# Patient Record
Sex: MALE | Race: WHITE | NOT HISPANIC OR LATINO | Employment: OTHER | URBAN - METROPOLITAN AREA
[De-identification: names, ages, dates, MRNs, and addresses within clinical notes are randomized per-mention and may not be internally consistent; named-entity substitution may affect disease eponyms.]

---

## 2017-04-26 PROBLEM — S89.91XA RIGHT KNEE INJURY: Status: ACTIVE | Noted: 2017-04-26

## 2017-05-10 PROBLEM — S82.141A CLOSED FRACTURE OF RIGHT TIBIAL PLATEAU: Status: ACTIVE | Noted: 2017-05-10

## 2018-10-05 PROBLEM — R07.9 CHEST PAIN: Status: ACTIVE | Noted: 2018-10-05

## 2018-10-05 PROBLEM — Z72.0 TOBACCO ABUSE: Status: ACTIVE | Noted: 2018-10-05

## 2018-10-05 PROBLEM — E10.9 TYPE 1 DIABETES: Status: ACTIVE | Noted: 2018-10-05

## 2018-10-05 PROBLEM — R56.9 SEIZURES: Status: ACTIVE | Noted: 2018-10-05

## 2018-10-06 PROBLEM — R07.89 CHEST PAIN, MUSCULOSKELETAL: Status: ACTIVE | Noted: 2018-10-05

## 2018-11-22 PROBLEM — S72.009A HIP FRACTURE: Status: ACTIVE | Noted: 2018-11-22

## 2018-11-22 PROBLEM — J18.9 PNEUMONIA: Status: ACTIVE | Noted: 2018-11-22

## 2018-12-06 DIAGNOSIS — M25.551 RIGHT HIP PAIN: Primary | ICD-10-CM

## 2018-12-07 ENCOUNTER — TELEPHONE (OUTPATIENT)
Dept: ORTHOPEDICS | Facility: CLINIC | Age: 43
End: 2018-12-07

## 2018-12-07 NOTE — TELEPHONE ENCOUNTER
Patient showed up for an appt with Dr. Ibarra today at 217pm. His appt was for 930am. Patient's wife visibly upset that Dr. Ibarra is not here this afternoon. Rescheduled patient to Monday at 2pm.

## 2018-12-10 ENCOUNTER — OFFICE VISIT (OUTPATIENT)
Dept: ORTHOPEDICS | Facility: CLINIC | Age: 43
End: 2018-12-10
Payer: MEDICARE

## 2018-12-10 ENCOUNTER — HOSPITAL ENCOUNTER (OUTPATIENT)
Dept: RADIOLOGY | Facility: HOSPITAL | Age: 43
Discharge: HOME | End: 2018-12-10
Attending: ORTHOPAEDIC SURGERY
Payer: MEDICARE

## 2018-12-10 VITALS
HEIGHT: 68 IN | HEART RATE: 99 BPM | SYSTOLIC BLOOD PRESSURE: 118 MMHG | DIASTOLIC BLOOD PRESSURE: 79 MMHG | BODY MASS INDEX: 19.14 KG/M2 | WEIGHT: 126.31 LBS

## 2018-12-10 DIAGNOSIS — S72.001A CLOSED FRACTURE OF RIGHT HIP, INITIAL ENCOUNTER: Primary | ICD-10-CM

## 2018-12-10 DIAGNOSIS — S72.001A CLOSED FRACTURE OF RIGHT HIP, INITIAL ENCOUNTER: ICD-10-CM

## 2018-12-10 DIAGNOSIS — S72.001D CLOSED FRACTURE OF RIGHT HIP WITH ROUTINE HEALING, SUBSEQUENT ENCOUNTER: Primary | ICD-10-CM

## 2018-12-10 PROCEDURE — 99999 PR PBB SHADOW E&M-EST. PATIENT-LVL III: CPT | Mod: PBBFAC,,, | Performed by: ORTHOPAEDIC SURGERY

## 2018-12-10 PROCEDURE — 73502 X-RAY EXAM HIP UNI 2-3 VIEWS: CPT | Mod: 26,RT,, | Performed by: RADIOLOGY

## 2018-12-10 PROCEDURE — 99024 POSTOP FOLLOW-UP VISIT: CPT | Mod: S$GLB,,, | Performed by: ORTHOPAEDIC SURGERY

## 2018-12-10 PROCEDURE — 73502 X-RAY EXAM HIP UNI 2-3 VIEWS: CPT | Mod: TC,PO,RT

## 2018-12-10 NOTE — PROGRESS NOTES
Subjective:      Patient ID: Manjit Zambrano is a 43 y.o. male.    Chief Complaint: Post-op Evaluation of the Left Hip and Post-op Evaluation (s/p ORIF femoral neck 11/23/18)    Pt rates his pain as 10/10 today. He says he is still smoking but not as much.   Social History     Occupational History    Occupation: DISABLED   Tobacco Use    Smoking status: Current Every Day Smoker     Packs/day: 0.50     Years: 20.00     Pack years: 10.00    Smokeless tobacco: Never Used   Substance and Sexual Activity    Alcohol use: No    Drug use: Yes     Comment: PAIN MANAGEMENT IN [employer]    Sexual activity: Not on file            Objective:    Ortho Exam     RLE: Neurovascularly intact, incision well healed, minimal swelling, staples are intact.  No signs of infection. Fair ROM of the hip.       XRAYS: 2 views of right hip obtained and reviewed today reveal  Good alignment. Hardware is intact  .   Assessment:         s/p right hip pinning  Plan:       Weight bearing as tolerated with walker. Pt is in pain management and pain meds will be managed by his pain doctor.   Home health PT ordered. F/u 1 month with xray of the right hip.

## 2018-12-12 ENCOUNTER — TELEPHONE (OUTPATIENT)
Dept: ORTHOPEDICS | Facility: CLINIC | Age: 43
End: 2018-12-12

## 2018-12-12 NOTE — TELEPHONE ENCOUNTER
Spoke to Skylar with [facility] home health. Patient is not returning calls to home health. Trying to set up home health therapy. Patient would be better to go to outpatient physical therapy. Patient refused upon discharging from the hospital as well.

## 2018-12-21 ENCOUNTER — TELEPHONE (OUTPATIENT)
Dept: ORTHOPEDICS | Facility: CLINIC | Age: 43
End: 2018-12-21

## 2018-12-21 NOTE — TELEPHONE ENCOUNTER
----- Message from Bertha Ryan MA sent at 12/21/2018  1:43 PM CST -----  Contact: Dina//RN  Patient reported he fell and landed on his bottom in the kitchen a few days ago. He has agreed to go outpatient PT at [facility] in [place]. Dina would like orders for that and would like the verbal 'OK'    Call Back# [phone]

## 2018-12-21 NOTE — TELEPHONE ENCOUNTER
----- Message from Bertha Ryan MA sent at 12/21/2018  2:02 PM CST -----  Contact: Salad[facility]  A fax was sent over from [employer] and they would like to verify if the fax was received or not. Call Back either way please    Call back# [phone]  Thanks

## 2018-12-27 DIAGNOSIS — S72.001D CLOSED FRACTURE OF RIGHT HIP WITH ROUTINE HEALING, SUBSEQUENT ENCOUNTER: Primary | ICD-10-CM

## 2018-12-28 ENCOUNTER — TELEPHONE (OUTPATIENT)
Dept: ORTHOPEDICS | Facility: CLINIC | Age: 43
End: 2018-12-28

## 2018-12-28 NOTE — TELEPHONE ENCOUNTER
----- Message from Mehrdad Abdul sent at 12/28/2018  7:57 AM CST -----  Contact: Saritha - [facility]  Type: Needs Medical Advice    Who Called: Saritha Colon Call Back Number: [phone]; fax number: [phone]  Additional Information: Caller would like to verify received faxed information as there is no doctor signature and diagnosis code. Please call to confirm. Thanks!

## 2019-01-07 ENCOUNTER — HOSPITAL ENCOUNTER (OUTPATIENT)
Dept: RADIOLOGY | Facility: HOSPITAL | Age: 44
Discharge: HOME | End: 2019-01-07
Attending: ORTHOPAEDIC SURGERY
Payer: MEDICARE

## 2019-01-07 ENCOUNTER — OFFICE VISIT (OUTPATIENT)
Dept: ORTHOPEDICS | Facility: CLINIC | Age: 44
End: 2019-01-07
Payer: MEDICARE

## 2019-01-07 VITALS
WEIGHT: 126.31 LBS | DIASTOLIC BLOOD PRESSURE: 73 MMHG | BODY MASS INDEX: 19.14 KG/M2 | SYSTOLIC BLOOD PRESSURE: 117 MMHG | HEART RATE: 92 BPM | HEIGHT: 68 IN

## 2019-01-07 DIAGNOSIS — S72.001D CLOSED FRACTURE OF RIGHT HIP WITH ROUTINE HEALING, SUBSEQUENT ENCOUNTER: Primary | ICD-10-CM

## 2019-01-07 DIAGNOSIS — S72.001D CLOSED FRACTURE OF RIGHT HIP WITH ROUTINE HEALING, SUBSEQUENT ENCOUNTER: ICD-10-CM

## 2019-01-07 PROCEDURE — 73502 X-RAY EXAM HIP UNI 2-3 VIEWS: CPT | Mod: TC,PO,RT

## 2019-01-07 PROCEDURE — 99999 PR PBB SHADOW E&M-EST. PATIENT-LVL III: CPT | Mod: PBBFAC,,, | Performed by: ORTHOPAEDIC SURGERY

## 2019-01-07 PROCEDURE — 99024 POSTOP FOLLOW-UP VISIT: CPT | Mod: S$GLB,,, | Performed by: ORTHOPAEDIC SURGERY

## 2019-01-07 PROCEDURE — 73502 X-RAY EXAM HIP UNI 2-3 VIEWS: CPT | Mod: 26,RT,, | Performed by: RADIOLOGY

## 2019-01-07 PROCEDURE — 73502 XR HIP 2 VIEW RIGHT: ICD-10-PCS | Mod: 26,RT,, | Performed by: RADIOLOGY

## 2019-01-07 PROCEDURE — 99024 PR POST-OP FOLLOW-UP VISIT: ICD-10-PCS | Mod: S$GLB,,, | Performed by: ORTHOPAEDIC SURGERY

## 2019-01-07 PROCEDURE — 99999 PR PBB SHADOW E&M-EST. PATIENT-LVL III: ICD-10-PCS | Mod: PBBFAC,,, | Performed by: ORTHOPAEDIC SURGERY

## 2019-01-07 RX ORDER — CLOBETASOL PROPIONATE 0.5 MG/G
1 OINTMENT TOPICAL DAILY
COMMUNITY
Start: 2018-12-10

## 2019-01-07 RX ORDER — ZOLPIDEM TARTRATE 10 MG/1
10 TABLET ORAL NIGHTLY PRN
Refills: 1 | COMMUNITY
Start: 2018-12-06

## 2019-01-07 RX ORDER — INSULIN GLARGINE 100 [IU]/ML
30 INJECTION, SOLUTION SUBCUTANEOUS DAILY
Refills: 5 | COMMUNITY
Start: 2018-12-05

## 2019-01-07 NOTE — PROGRESS NOTES
"Subjective:      Patient ID: Manjit Zambrano is a 43 y.o. male.    Chief Complaint: Post-op Evaluation of the Right Hip and Post-op Evaluation (s/p ORIF femoral neck 11/23/18)    Pt rates his pain as 8/10 today. He refused home health PT so we ordered outpatient PT but he says "they never called him". He is complaining of his back and hip hurting him saying "something is wrong".     Social History     Occupational History    Occupation: DISABLED   Tobacco Use    Smoking status: Current Every Day Smoker     Packs/day: 0.50     Years: 20.00     Pack years: 10.00    Smokeless tobacco: Never Used   Substance and Sexual Activity    Alcohol use: No    Drug use: Yes     Comment: [employer]    Sexual activity: Not on file            Objective:    Ortho Exam     GEN: Pt is slurring his words and appears to be under heavy influence of narcotics.       RLE: Neurovascularly intact, Improving ROM of the hip.       XRAYS: 2 views of right hip obtained and reviewed today reveal  Good alignment. Hardware is intact  . There is interval progression of healing of the fracture.   Assessment:         s/p right hip pinning  Plan:       Weight bearing as tolerated. Pt is in pain management and pain meds will continue to be managed by his pain doctor. I recommend he call to set up his PT appointments.   I told him I do not do backs but I referred him to [facility] for evaluation and treatment.           "

## 2019-01-11 ENCOUNTER — TELEPHONE (OUTPATIENT)
Dept: ORTHOPEDICS | Facility: CLINIC | Age: 44
End: 2019-01-11

## 2019-01-11 NOTE — TELEPHONE ENCOUNTER
----- Message from Maria Luz Kirk sent at 1/11/2019  4:20 PM CST -----  Mom- Farrah Diaz [phone] or [phone] is requesting a call back to whats the next step for her son. She stated Dr Ibarra referred him to a back specialist  but she does not think that's a good idea. Please give her a call and advise.

## 2019-01-11 NOTE — TELEPHONE ENCOUNTER
Spoke to patients mom and let her know what that Dr. Ibarra advises that the patient sees [facility] and start Pt. Patient states Pt next week and canceled his appointment for [facility] Sp[facility], I advise the patients mom that I can reschedule the appointment for her so he can have an evaluation done on his back because the patient stated that he was having back pain, and dr. Ibarra recommends he sees some one and that along with PT may help him with the shooting pains he is having, patients mom verbalizes understanding and states she will talk see what dates and times are convenient for them and call back to schedule an appointment sometime next week.

## 2019-06-29 LAB
ALLENS TEST: ABNORMAL
ANALYZED BY: ABNORMAL
DATE OF DRAW: ABNORMAL
DRAWN BY: ABNORMAL
EPAP: ABNORMAL CM H2O
FIO2: 21 %
FLOW: ABNORMAL LPM
FREQUENCY: ABNORMAL HZ
I TIME: ABNORMAL
IPAP: ABNORMAL CM H2O
MECH RATE (BPM): ABNORMAL BPM
MECH VT: ABNORMAL ML
MODE #1: ABNORMAL
NITRIC: ABNORMAL PPM
O2 DEVICE #1: ABNORMAL
O2 DEVICE #2: ABNORMAL
PAW: ABNORMAL CMH2O
PCO2 BLDA: 24 MMHG (ref 35–45)
PEEP: ABNORMAL CM H2O
PH SMN: 7.17 [PH] (ref 7.35–7.45)
PIP: ABNORMAL CM H2O
PO2 BLDA: 106 MMHG (ref 80–100)
POC A-ADO2: 14 MMHG
POC BE(B): -18.1 MMOL/L (ref -2–2)
POC COHB: 2.3 %
POC HCO3-(C): 8.8 MMOL/L (ref 22–26)
POC METHB: 0.6 %
POC O2HB: 96.5 % (ref 94–100)
POC PAO2: 120 MMHG
POC PCO2 TEMP: 24 MMHG (ref 35–45)
POC PH TEMP: 7.17 (ref 7.35–7.45)
POC PO2 TEMP: 106 MMHG (ref 80–100)
POC SATURATED O2: 99.5 % (ref 94–100)
POC TEMPERATURE: 37 C
POC THB: 9.5 G/DL (ref 12–18)
PRESSURE CONTROL: ABNORMAL CM H2O
PRESSURE SUPPORT: ABNORMAL CM H2O
PULSE OX: ABNORMAL %
SAMPLE SITE: ABNORMAL
TIME OF DRAW: 2210

## 2019-06-30 PROBLEM — E10.10 DKA, TYPE 1: Status: ACTIVE | Noted: 2019-06-30

## 2019-06-30 PROBLEM — G93.41 ENCEPHALOPATHY, METABOLIC: Status: ACTIVE | Noted: 2019-06-30

## 2019-06-30 PROBLEM — E10.65 HYPERGLYCEMIA DUE TO TYPE 1 DIABETES MELLITUS: Status: RESOLVED | Noted: 2019-06-30 | Resolved: 2019-06-30

## 2019-06-30 PROBLEM — E87.5 ACUTE HYPERKALEMIA: Status: RESOLVED | Noted: 2019-06-30 | Resolved: 2019-06-30

## 2019-06-30 PROBLEM — F11.20 NARCOTIC DEPENDENCE: Status: ACTIVE | Noted: 2019-06-30

## 2019-06-30 PROBLEM — N17.9 AKI (ACUTE KIDNEY INJURY): Status: ACTIVE | Noted: 2019-06-30

## 2019-06-30 PROBLEM — R79.89 ELEVATED TROPONIN: Status: ACTIVE | Noted: 2019-06-30

## 2019-06-30 PROBLEM — E10.65 HYPERGLYCEMIA DUE TO TYPE 1 DIABETES MELLITUS: Status: ACTIVE | Noted: 2019-06-30

## 2019-06-30 PROBLEM — E87.5 ACUTE HYPERKALEMIA: Status: ACTIVE | Noted: 2019-06-30

## 2019-07-01 PROBLEM — E11.10: Status: ACTIVE | Noted: 2019-07-01

## 2019-07-02 PROBLEM — R79.89 ELEVATED TROPONIN: Status: RESOLVED | Noted: 2019-06-30 | Resolved: 2019-07-02

## 2019-07-02 PROBLEM — E11.10: Status: RESOLVED | Noted: 2019-07-01 | Resolved: 2019-07-02
